# Patient Record
Sex: FEMALE | Race: WHITE | Employment: UNEMPLOYED | URBAN - METROPOLITAN AREA
[De-identification: names, ages, dates, MRNs, and addresses within clinical notes are randomized per-mention and may not be internally consistent; named-entity substitution may affect disease eponyms.]

---

## 2022-01-07 ENCOUNTER — HOSPITAL ENCOUNTER (OUTPATIENT)
Dept: PREADMISSION TESTING | Age: 87
Discharge: HOME | End: 2022-01-11

## 2022-01-07 VITALS — BODY MASS INDEX: 29.81 KG/M2 | HEIGHT: 58 IN | WEIGHT: 142 LBS

## 2022-01-07 RX ORDER — METHIMAZOLE 10 MG/1
10 TABLET ORAL DAILY
COMMUNITY

## 2022-01-07 RX ORDER — LISINOPRIL 40 MG/1
40 TABLET ORAL DAILY
COMMUNITY

## 2022-01-07 RX ORDER — HYDRALAZINE HYDROCHLORIDE 50 MG/1
50 TABLET, FILM COATED ORAL 3 TIMES DAILY
COMMUNITY

## 2022-01-07 RX ORDER — ATENOLOL 50 MG/1
50 TABLET ORAL DAILY
COMMUNITY

## 2022-01-07 RX ORDER — SIMVASTATIN 20 MG
20 TABLET ORAL NIGHTLY
COMMUNITY

## 2022-01-07 RX ORDER — CHOLECALCIFEROL (VITAMIN D3) 50 MCG
2000 TABLET ORAL DAILY
COMMUNITY

## 2022-01-07 NOTE — PROGRESS NOTES
Pre-op Instructions For Out-Patient Surgery    Medication Instructions:  · Please stop herbs and any supplements now (includes vitamins and minerals). · Please contact your surgeon and prescribing physician for pre-op instructions for any blood thinners. None    · If you have inhalers/aerosol treatments at home, please use them the morning of your surgery and bring the inhalers with you to the hospital.    · Please take the following medications the morning of your surgery with a sip of water:    Hydralazine, Lisinopril, Atenolol and Methimazole. Surgery Instructions:  1. After midnight before surgery:  Do not eat or drink anything, including water, mints, gum, and hard candy. You may brush your teeth without swallowing. No smoking, chewing tobacco, or street drugs. 2. Please shower or bathe before surgery. If you were given Surgical Scrub Chlorhexidine Gluconate Liquid (CHG), please shower the night before and the morning of your surgery following the detailed instructions you received during your pre-admission visit. 3. Please do not wear any cologne, lotion, powder, deodorant, jewelry, piercings, perfume, makeup, nail polish, hair accessories, or hair spray on the day of surgery. Wear loose comfortable clothing. 4. Leave your valuables at home. Bring a storage case for any glasses/contacts. 5. An adult who is responsible for you MUST drive you home and should be with you for the first 24 hours after surgery. 6. If having out-patient knee and foot surgeries, please arrange for planned crutches, walker, or wheelchair before arriving to the hospital.    The Day of Surgery:  · Arrive at [address] [place] [address] Surgery Entrance at the time directed by your surgeon and check in at the desk. · If you have a living will or healthcare power of , please bring a copy.     · You will be taken to the pre-op holding area where you will be prepared for surgery. A physical assessment will be performed by a nurse practitioner or house officer. Your IV will be started and you will meet your anesthesiologist.    · When you go to surgery, your family will be directed to the surgical waiting room, where the doctor should speak with them after your surgery. · After surgery, you will be taken to the recovery room then when you are awake and stable you will go to the short stay unit for preparation to be discharged. Only your one designated person is allowed to come to short stay for your discharge. · If you use a Bi-PAP or C-PAP machine, please bring it with you and leave it in the car in case it is needed in recovery room. Instructions read to [facility] and understanding verbalized.

## 2022-01-12 ENCOUNTER — HOSPITAL ENCOUNTER (OUTPATIENT)
Dept: WOMENS IMAGING | Age: 87
Discharge: HOME | End: 2022-01-14
Payer: MEDICARE

## 2022-01-12 ENCOUNTER — APPOINTMENT (OUTPATIENT)
Dept: CT IMAGING | Age: 87
End: 2022-01-12
Payer: MEDICARE

## 2022-01-12 ENCOUNTER — HOSPITAL ENCOUNTER (OUTPATIENT)
Dept: CT IMAGING | Age: 87
Discharge: HOME | End: 2022-01-14
Payer: MEDICARE

## 2022-01-12 DIAGNOSIS — N63.12 BREAST LUMP ON RIGHT SIDE AT 1 O'CLOCK POSITION: ICD-10-CM

## 2022-01-12 LAB
GFR NON-AFRICAN AMERICAN: 53 ML/MIN
GFR SERPL CREATININE-BSD FRML MDRD: >60 ML/MIN
GFR SERPL CREATININE-BSD FRML MDRD: ABNORMAL ML/MIN/{1.73_M2}
POC CREATININE: 0.99 MG/DL (ref 0.51–1.19)

## 2022-01-12 PROCEDURE — G0279 TOMOSYNTHESIS, MAMMO: HCPCS

## 2022-01-12 PROCEDURE — 82565 ASSAY OF CREATININE: CPT

## 2022-01-12 PROCEDURE — 6360000004 HC RX CONTRAST MEDICATION: Performed by: SURGERY

## 2022-01-12 PROCEDURE — 71260 CT THORAX DX C+: CPT

## 2022-01-12 PROCEDURE — 76642 ULTRASOUND BREAST LIMITED: CPT

## 2022-01-12 PROCEDURE — 2580000003 HC RX 258: Performed by: SURGERY

## 2022-01-12 RX ORDER — SODIUM CHLORIDE 0.9 % (FLUSH) 0.9 %
10 SYRINGE (ML) INJECTION PRN
Status: DISCONTINUED | OUTPATIENT
Start: 2022-01-12 | End: 2022-01-15 | Stop reason: HOSPADM

## 2022-01-12 RX ORDER — 0.9 % SODIUM CHLORIDE 0.9 %
80 INTRAVENOUS SOLUTION INTRAVENOUS ONCE
Status: COMPLETED | OUTPATIENT
Start: 2022-01-12 | End: 2022-01-12

## 2022-01-12 RX ADMIN — IOPAMIDOL 75 ML: 755 INJECTION, SOLUTION INTRAVENOUS at 11:51

## 2022-01-12 RX ADMIN — SODIUM CHLORIDE, PRESERVATIVE FREE 10 ML: 5 INJECTION INTRAVENOUS at 11:51

## 2022-01-12 RX ADMIN — SODIUM CHLORIDE 80 ML: 9 INJECTION, SOLUTION INTRAVENOUS at 11:51

## 2022-01-14 ENCOUNTER — HOSPITAL ENCOUNTER (OUTPATIENT)
Age: 87
Setting detail: OUTPATIENT SURGERY
Discharge: HOME | End: 2022-01-14
Attending: SURGERY | Admitting: SURGERY
Payer: MEDICARE

## 2022-01-14 ENCOUNTER — HOSPITAL ENCOUNTER (OUTPATIENT)
Dept: ULTRASOUND IMAGING | Age: 87
Setting detail: OUTPATIENT SURGERY
Discharge: HOME | End: 2022-01-16
Attending: SURGERY
Payer: MEDICARE

## 2022-01-14 VITALS
DIASTOLIC BLOOD PRESSURE: 77 MMHG | TEMPERATURE: 98.4 F | SYSTOLIC BLOOD PRESSURE: 156 MMHG | WEIGHT: 142 LBS | RESPIRATION RATE: 18 BRPM | HEART RATE: 75 BPM | OXYGEN SATURATION: 96 % | HEIGHT: 58 IN | BODY MASS INDEX: 29.81 KG/M2

## 2022-01-14 DIAGNOSIS — N63.0 BREAST MASS: ICD-10-CM

## 2022-01-14 PROCEDURE — 88377 M/PHMTRC ALYS ISHQUANT/SEMIQ: CPT

## 2022-01-14 PROCEDURE — 2500000003 HC RX 250 WO HCPCS: Performed by: SURGERY

## 2022-01-14 PROCEDURE — A4648 IMPLANTABLE TISSUE MARKER: HCPCS | Performed by: SURGERY

## 2022-01-14 PROCEDURE — 88360 TUMOR IMMUNOHISTOCHEM/MANUAL: CPT

## 2022-01-14 PROCEDURE — 2709999900 HC NON-CHARGEABLE SUPPLY: Performed by: SURGERY

## 2022-01-14 PROCEDURE — 76942 ECHO GUIDE FOR BIOPSY: CPT

## 2022-01-14 PROCEDURE — 7100000010 HC PHASE II RECOVERY - FIRST 15 MIN: Performed by: SURGERY

## 2022-01-14 PROCEDURE — 3600000012 HC SURGERY LEVEL 2 ADDTL 15MIN: Performed by: SURGERY

## 2022-01-14 PROCEDURE — 7100000011 HC PHASE II RECOVERY - ADDTL 15 MIN: Performed by: SURGERY

## 2022-01-14 PROCEDURE — 88305 TISSUE EXAM BY PATHOLOGIST: CPT

## 2022-01-14 PROCEDURE — 3600000002 HC SURGERY LEVEL 2 BASE: Performed by: SURGERY

## 2022-01-14 RX ORDER — LIDOCAINE HYDROCHLORIDE 10 MG/ML
INJECTION, SOLUTION INFILTRATION; PERINEURAL PRN
Status: DISCONTINUED | OUTPATIENT
Start: 2022-01-14 | End: 2022-01-14 | Stop reason: ALTCHOICE

## 2022-01-14 ASSESSMENT — PAIN - FUNCTIONAL ASSESSMENT: PAIN_FUNCTIONAL_ASSESSMENT: 0-10

## 2022-01-14 ASSESSMENT — PAIN DESCRIPTION - PAIN TYPE: TYPE: SURGICAL PAIN

## 2022-01-14 ASSESSMENT — ENCOUNTER SYMPTOMS
GASTROINTESTINAL NEGATIVE: 1
RESPIRATORY NEGATIVE: 1

## 2022-01-14 ASSESSMENT — PAIN SCALES - GENERAL: PAINLEVEL_OUTOF10: 1

## 2022-01-14 ASSESSMENT — PAIN DESCRIPTION - LOCATION: LOCATION: CHEST

## 2022-01-14 NOTE — OP NOTE
Operative Note      Patient: Troy Chua  YOB: 1935  MRN: [id number]    Date of Procedure: 1/14/2022                Preoperative diagnosis: Malignant appearing right chest wall mass and right breast mass 1 o'clock position    Postoperative diagnosis: Same    Procedure: Ultrasound-guided multiple core biopsies (14 G) right chest wall mass and right breast mass at 1 o'clock position    Surgeon: Dr. Esmer Johnson    Asst.: None    Anesthesia: Local    Preparation: Chloraprep    EBL: Less than 10 mL    Specimen: Multiple core biopsies malignant appearing right chest wall mass and right breast mass 1 o'clock position    Procedure: Informed consent was obtained. Site was marked and confirmed. Imaging studies were reviewed. Patient was taken to the operating room. Vital signs were monitored remained stable throughout the procedure. Abnormality seen on imaging studies was scanned and confirmed. Operative site was prepped and draped in usual sterile fashion. Timeout was done. Initially multiple core biopsies were obtained from this malignant appearing right chest wall mass. Local anesthetic was infiltrated. Incision was made using a #11 blade. Under ultrasound guidance multiple core biopsies were obtained from this mass using 14-gauge core biopsy needle. Specimen was submitted to pathology for further evaluation. Hemostasis was confirmed with pressure. The other mass at 1 o'clock position on the right breast was also scanned and confirmed. This area was prepped and draped in usual sterile fashion. Local anesthetic was infiltrated. Incision was made with #11 blade. Again under ultrasound guidance multiple core biopsies were obtained. Specimen was submitted to pathology for further evaluation. Hemostasis was confirmed with pressure. Clean dressing was applied.   Patient tolerated procedure well and was transferred to the recovery room in a stable condition.    -  Recommendations: Findings were discussed with the family. Postoperative course recovery restrictions local care all discussed. Await pathology results.

## 2022-01-14 NOTE — H&P
HISTORY and [address]       NAME:  Rajwinder Owens  MRN: [id number]   YOB: 1935   Date: 1/14/2022   Age: 82 y.o. Gender: female       COMPLAINT AND PRESENT HISTORY:     Rajwinder Owens is 82 y.o.,  female, here for CORE BIOPSY UPPER INTER QUADRANT BREAST EXCISION W/ULTRASOUND & TECH Right per dr Ashley Saini     Pre diagnosis:RIGHT BREAST MASS   HPI:  Pt states she notice hard lump on her upper chest started  6-7 months ago, and it has worsened in the last months. Pt denies any injury to the right breast,  no FH of breast cancer . Pt reports that if  she hit the lump hard , it bleeds,  If she sleep on her chest she feel pain otherwise no pain. Pt states her right nipple is dimpling, no drainage, no redness. Pt denies fever/chills, chest pain or SOB, no night sweats, no itching skin, no fatigue,or unexplained weight loss. Test completed r/t condition:  US BREAST LIMITED RIGHT  FINDINGS:   Breast parenchyma is scattered fibroglandular.       There are benign calcifications in each breast.  These are larger and   somewhat amorphous in the left breast.  There are benign vascular   calcifications.       Unremarkable mammographic appearance of the left breast.       Because of an ulcerating mass over the upper right breast near the chest   wall, only a small amount of right breast tissue could be imaged. Julio Cesar Muñoz is   suggestion of retraction of the right nipple.  Also suggestion of a mass far   posterior in the right breast, incompletely imaged, and containing some   calcifications.       RIGHT BREAST ULTRASOUND: The large ulcerating mass at 1 o'clock and 14 cm   from the nipple is somewhat heterogeneous and extremely vascular in nature.    It extends far posterior to the pectoralis and possibly through the   pectoralis muscle by ultrasound evaluation.  This is very suspicious for   neoplastic process.       A 2nd mass is located at 1 o'clock and 5 cm from the nipple and correlates   with CT findings today.  This measures at least 11 x 12 x 10 mm and is very   irregular in outline and taller than wide.  It is very suspicious for   neoplastic process.           Impression   Two very suspicious masses in the right breast, as described above. Leeanne Rojas   is recommended.       Dr. Lisa Tineo and the in-house nurse navigator personally spoke with this   patient and her daughter following the patient's studies today.  The daughter   expressed understanding.       The patient and her daughter were encouraged to make contact with her surgeon   for further information regarding follow-up.       BIRADS:   BIRADS - CATEGORY 5       Highly Suggestive for Malignancy.  Biopsy should be considered at this time.       OVERALL ASSESSMENT - HIGHLY SUGGESTIVE OF MALIGNANCY.       A letter of notification will be sent to the patient regarding the results.       My findings and recommendations were discussed with the patient at the time   of service.       A representative from the radiology department will be contacting your office   and assisting the patient in getting appropriate follow-up.           CT CHEST W CONTRAST  FINDINGS:   Mediastinum: Atherosclerotic disease identified within the thoracic aorta.    No focal aortic stenosis is identified.  No aortic dissection.  Great vessel   atherosclerotic disease without stenosis.  No central pulmonary emboli are   identified.  No pericardial effusion is identified.  Mild cardiomegaly.  No   significant focal wall thickening is identified.  No mediastinal   lymphadenopathy is seen based on size criteria.  Calcified lymph nodes are   seen felt related to remote granulomatous process.       Lungs/pleura: Scattered calcified pulmonary nodules felt related to chronic   granulomatous process.  Dependent changes are seen with subsegmental   appearing atelectasis.  No airspace consolidation is identified.  No   spiculated lung mass is seen.  Post inflammatory appearing right apical   scarring.       Upper Abdomen: No right-sided adrenal mass is identified. Randarisimone Rothman is a 1.8 x   2.1 cm left adrenal mass which measures 55 Hounsfield units on this single   phase postcontrast CT study.  No enhancing mass identified in the liver. Benign-appearing liver calcifications seen at the dome of the liver.  No   splenic mass is identified.       Soft Tissues/Bones: No axillary lymphadenopathy is identified.  Multinodular   appearing thyroid gland with small partially calcified nodules, the largest   of which measuring 13 mm in the left thyroid lobe.  Within the parasternal   soft tissues of the right anterior chest wall, on and around axial image 46,   is a soft tissue lobulated appearing irregular mass measuring 3.1 x 2.3 cm,   with a concerning area of focal soft tissue extension from the posterior   aspect of the mass to the anterior pleural surface, on axial image 46   adjacent to the internal thoracic artery. Holly Rothman is a 2nd enhancing breast   nodule seen measuring 10 x 11 mm on axial image 56.           Impression   There is a large ulcerating mass over the right breast medially corresponding   to recent ultrasound and mammographic evaluation, complex in shape, with   extension through the pectoralis musculature posteriorly, with a small focal   linear area of suspected soft tissue extension through the anterior chest   wall adjacent to the anterior pleural surface adjacent and internal thoracic   artery best seen on axial image 46.       There is a second small enhancing breast mass measuring 11 mm in greatest   dimension on axial image 56 corresponding to the recently seen mammographic   exams.       No enlarged lymphadenopathy is seen in the right axillary region.       Nonspecific left adrenal mass measuring 18 x 21 mm, and 55 Hounsfield units.    This does not meet criteria for a benign adenoma, with metastatic disease   unable to be excluded.       Small partially calcified thyroid nodules, the largest of which measuring 13   mm.       RECOMMENDATIONS:   Managing Incidental Thyroid Nodule Detected at CT or MRI or US       1.  Further evaluation by thyroid Ultrasound recommended for these incidental   nodules:       Patient Age 28 years or more - Nodule 1.5 cm in size or greater       2. Follow up thyroid ultrasound also recommend in these scenarios       - Solitary nodule with high risk imaging features (locally invasive nodule or   suspicious lymph nodes)       - Heterogeneous, enlarged thyroid gland.       - Increased uptake on PET       3.  No further imaging is recommended in the following scenarios       - Any nodule not meeting above criteria.       - Those patients with limited life expectancy or significant       Co-morbidities.       Note: These recommendations do not apply to pts. w/ increased risk       for thyroid cancer or pts. with symptomatic thyroid disease.       ________________________________________________________________       Recommendations for f/u of Incidental Thyroid Nodules (ITN)       found on CT, MR, NM and Extrathyroidal US are based upon the ACR       white paper and [facility] 3-tiered system for managing ITNs:       J Am Sumit Radiol.  2015 Feb;12(2): 143-50     JASWANT GENEVIEVE DIGITAL DIAGNOSTIC BILATERAL  FINDINGS:   Breast parenchyma is scattered fibroglandular.       There are benign calcifications in each breast.  These are larger and   somewhat amorphous in the left breast.  There are benign vascular   calcifications.       Unremarkable mammographic appearance of the left breast.       Because of an ulcerating mass over the upper right breast near the chest   wall, only a small amount of right breast tissue could be imaged. Nino Spatz is   suggestion of retraction of the right nipple.  Also suggestion of a mass far   posterior in the right breast, incompletely imaged, and containing some   calcifications.       RIGHT BREAST ULTRASOUND: The large ulcerating mass at 1 o'clock and 14 cm   from the nipple is somewhat heterogeneous and extremely vascular in nature. It extends far posterior to the pectoralis and possibly through the   pectoralis muscle by ultrasound evaluation.  This is very suspicious for   neoplastic process.       A 2nd mass is located at 1 o'clock and 5 cm from the nipple and correlates   with CT findings today.  This measures at least 11 x 12 x 10 mm and is very   irregular in outline and taller than wide.  It is very suspicious for   neoplastic process.           Impression   Two very suspicious masses in the right breast, as described above.  Biopsy   is recommended.       Dr. Marifer Muñiz and the in-house nurse navigator personally spoke with this   patient and her daughter following the patient's studies today.  The daughter   expressed understanding.       The patient and her daughter were encouraged to make contact with her surgeon   for further information regarding follow-up.       BIRADS:   BIRADS - CATEGORY 5       Highly Suggestive for Malignancy.  Biopsy should be considered at this time.       OVERALL ASSESSMENT - HIGHLY SUGGESTIVE OF MALIGNANCY.       A letter of notification will be sent to the patient regarding the results.       My findings and recommendations were discussed with the patient at the time   of service.       A representative from the radiology department will be contacting your office   and assisting the patient in getting appropriate follow-up.               Review of additional significant medical hx:  HTN, hyperlipidemia  MEDICATION R/T CONDITION : ATENOLOL , APRESOLINE, LISINOPRIL, ZOCOR    THYROID DISEASE  MEDICATION R/T CONDITION : TAPAZOLE       NPO status: Pt NPO since the past midnight   Medications taken TODAY (with sip of water): pt took her BP medication with sip of water  Anticoagulation status: none   Denies personal hx of blood clots. Denies personal hx of MRSA infection.   Denies any personal or family hx of previous complications w/anesthesia. PAST MEDICAL HISTORY     Past Medical History:   Diagnosis Date    Chest wall mass     Hyperlipidemia     Hypertension     Pseudogout     Acute Pain in Left knee    Thyroid disease        SURGICAL HISTORY       Past Surgical History:   Procedure Laterality Date    EYE SURGERY      cataract bilat     SKIN BIOPSY      Right eye - positive        FAMILY HISTORY     No family history on file. SOCIAL HISTORY       Social History     Socioeconomic History    Marital status:      Spouse name: Not on file    Number of children: Not on file    Years of education: Not on file    Highest education level: Not on file   Occupational History    Not on file   Tobacco Use    Smoking status: Never Smoker    Smokeless tobacco: Never Used   Vaping Use    Vaping Use: Never used   Substance and Sexual Activity    Alcohol use: Yes     Comment: Occasional     Drug use: Never    Sexual activity: Not on file   Other Topics Concern    Not on file   Social History Narrative    Not on file     Social Determinants of Health     Financial Resource Strain:     Difficulty of Paying Living Expenses: Not on file   Food Insecurity:     Worried About Running Out of Food in the Last Year: Not on file    Colleen of Food in the Last Year: Not on file   Transportation Needs:     Lack of Transportation (Medical): Not on file    Lack of Transportation (Non-Medical):  Not on file   Physical Activity:     Days of Exercise per Week: Not on file    Minutes of Exercise per Session: Not on file   Stress:     Feeling of Stress : Not on file   Social Connections:     Frequency of Communication with Friends and Family: Not on file    Frequency of Social Gatherings with Friends and Family: Not on file    Attends Jewish Services: Not on file    Active Member of Clubs or Organizations: Not on file    Attends Club or Organization Meetings: Not on file    Marital Status: Not on file Intimate Partner Violence:     Fear of Current or Ex-Partner: Not on file    Emotionally Abused: Not on file    Physically Abused: Not on file    Sexually Abused: Not on file   Housing Stability:     Unable to Pay for Housing in the Last Year: Not on file    Number of [place] in the Last Year: Not on file    Unstable Housing in the Last Year: Not on file           REVIEW OF SYSTEMS      No Known Allergies    No current facility-administered medications on file prior to encounter. No current outpatient medications on file prior to encounter. Review of Systems   Constitutional: Negative. HENT: Negative. Respiratory: Negative. Cardiovascular: Negative. Gastrointestinal: Negative. Genitourinary: Negative. Musculoskeletal: Negative. Skin: Negative. Neurological: Negative. Hematological: Negative. Psychiatric/Behavioral: Negative. GENERAL PHYSICAL EXAM     Vitals: see nursing flow sheet for vital signs     GENERAL APPEARANCE:   Naty Retana is 82 y.o.,  female, , nourished, conscious, alert. Does not appear to be distress or pain at this time. Physical Exam  Constitutional:       Appearance: Normal appearance. HENT:      Head: Normocephalic. Right Ear: External ear normal.      Left Ear: External ear normal.      Nose: Nose normal.      Mouth/Throat:      Mouth: Mucous membranes are moist.   Eyes:      General:         Right eye: No discharge. Left eye: No discharge. Pupils: Pupils are equal, round, and reactive to light. Cardiovascular:      Rate and Rhythm: Normal rate and regular rhythm. Pulses: Normal pulses. Radial pulses are 2+ on the right side and 2+ on the left side. Dorsalis pedis pulses are 2+ on the right side and 2+ on the left side. Posterior tibial pulses are 2+ on the right side and 2+ on the left side. Heart sounds: Normal heart sounds.  No murmur heard.  No gallop. Pulmonary:      Effort: Pulmonary effort is normal. No respiratory distress. Breath sounds: Normal breath sounds. No wheezing or rhonchi. Chest:   Breasts:      Right: Inverted nipple present. No nipple discharge or tenderness. Comments: There is hard lump on the right upper chest with retraction in the middle, redness, no drainage , no tenderness with palpation. Right nipple retraction, no discharge, no swelling. Abdominal:      General: Bowel sounds are normal. There is no distension. Palpations: Abdomen is soft. Tenderness: There is no abdominal tenderness. There is no guarding. Musculoskeletal:         General: Normal range of motion. Cervical back: Normal range of motion and neck supple. Right lower leg: No edema. Left lower leg: No edema. Skin:     General: Skin is warm. Findings: No bruising or erythema. Neurological:      General: No focal deficit present. Mental Status: She is alert and oriented to person, place, and time. Gait: Gait normal.   Psychiatric:         Mood and Affect: Mood normal.         Behavior: Behavior normal.                   PROVISIONAL DIAGNOSES / SURGERY:    RIGHT BREAST MASS     CORE BIOPSY UPPER INTER QUADRANT BREAST EXCISION W/ULTRASOUND & TECH Right      There are no problems to display for this patient.           LITZY Pro CNP on 1/14/2022 at 8:52 AM

## 2022-01-14 NOTE — BRIEF OP NOTE
Brief Postoperative Note      Patient: Sandra Castro  YOB: 1935  MRN: [id number]    Date of Procedure: 1/14/2022    Pre-Op Diagnosis: RIGHT BREAST MASS  (PT HAS HAD COVID VACCINE)    Post-Op Diagnosis: Same       Procedure(s):  CORE BIOPSY UPPER INTER QUADRANT BREAST EXCISION W/ULTRASOUND & TECH    Surgeon(s):  Kyler Weems MD    Assistant:  * No surgical staff found *    Anesthesia: Local    Estimated Blood Loss (mL): less than 50     Complications: None    Specimens:   ID Type Source Tests Collected by Time Destination   A : RIGHT CHEST WALL MASS Tissue Chest SURGICAL PATHOLOGY Kyler Weems MD 1/14/2022 1114    B : RIGHT BREAST MASS AT 1:00 5 CM FROM NIPPLE BIOPSY Tissue Breast SURGICAL PATHOLOGY Kyler Weems MD 1/14/2022 1125        Implants:  * No implants in log *      Drains: * No LDAs found *    Findings: dictated    Electronically signed by Kyler Weems MD on 1/14/2022 at 11:43 AM

## 2022-01-18 LAB — SURGICAL PATHOLOGY REPORT: NORMAL

## 2022-12-19 ENCOUNTER — HOSPITAL ENCOUNTER (OUTPATIENT)
Dept: MRI IMAGING | Age: 87
Discharge: HOME | End: 2022-12-21
Payer: MEDICARE

## 2022-12-19 DIAGNOSIS — C50.211 CARCINOMA OF UPPER-INNER QUADRANT OF RIGHT FEMALE BREAST, UNSPECIFIED ESTROGEN RECEPTOR STATUS (HCC): ICD-10-CM

## 2022-12-19 LAB
EGFR, POC: >60 ML/MIN/1.73M2
POC CREATININE: 0.91 MG/DL (ref 0.51–1.19)

## 2022-12-19 PROCEDURE — A9579 GAD-BASE MR CONTRAST NOS,1ML: HCPCS | Performed by: SURGERY

## 2022-12-19 PROCEDURE — 6360000004 HC RX CONTRAST MEDICATION: Performed by: SURGERY

## 2022-12-19 PROCEDURE — 82565 ASSAY OF CREATININE: CPT

## 2022-12-19 PROCEDURE — C8908 MRI W/O FOL W/CONT, BREAST,: HCPCS

## 2022-12-19 PROCEDURE — 2580000003 HC RX 258: Performed by: SURGERY

## 2022-12-19 RX ORDER — 0.9 % SODIUM CHLORIDE 0.9 %
40 INTRAVENOUS SOLUTION INTRAVENOUS ONCE
Status: COMPLETED | OUTPATIENT
Start: 2022-12-19 | End: 2022-12-19

## 2022-12-19 RX ORDER — SODIUM CHLORIDE 0.9 % (FLUSH) 0.9 %
10 SYRINGE (ML) INJECTION PRN
Status: DISCONTINUED | OUTPATIENT
Start: 2022-12-19 | End: 2022-12-22 | Stop reason: HOSPADM

## 2022-12-19 RX ADMIN — GADOTERIDOL 15 ML: 279.3 INJECTION, SOLUTION INTRAVENOUS at 12:17

## 2022-12-19 RX ADMIN — SODIUM CHLORIDE 40 ML: 9 INJECTION, SOLUTION INTRAVENOUS at 12:17

## 2022-12-19 RX ADMIN — SODIUM CHLORIDE, PRESERVATIVE FREE 10 ML: 5 INJECTION INTRAVENOUS at 12:17

## 2022-12-30 ENCOUNTER — HOSPITAL ENCOUNTER (OUTPATIENT)
Dept: WOMENS IMAGING | Age: 87
Discharge: HOME | End: 2022-12-30
Payer: MEDICARE

## 2022-12-30 ENCOUNTER — HOSPITAL ENCOUNTER (OUTPATIENT)
Dept: WOMENS IMAGING | Age: 87
End: 2022-12-30
Payer: MEDICARE

## 2022-12-30 DIAGNOSIS — N63.15 BREAST LUMP ON RIGHT SIDE AT 3 O'CLOCK POSITION: ICD-10-CM

## 2022-12-30 DIAGNOSIS — R93.89 ABNORMAL FINDING ON RADIOLOGY EXAM: ICD-10-CM

## 2022-12-30 PROCEDURE — 77066 DX MAMMO INCL CAD BI: CPT

## 2022-12-30 PROCEDURE — 76642 ULTRASOUND BREAST LIMITED: CPT

## 2023-09-08 ENCOUNTER — HOSPITAL ENCOUNTER (OUTPATIENT)
Dept: PREADMISSION TESTING | Age: 88
Discharge: HOME | End: 2023-09-12

## 2023-09-08 VITALS — HEIGHT: 58 IN | BODY MASS INDEX: 29.81 KG/M2 | WEIGHT: 142 LBS

## 2023-09-08 NOTE — PROGRESS NOTES
Pre-op Instructions For Out-Patient Surgery   LOCAL    Medication Instructions:  Please stop herbs and any supplements now (includes vitamins and minerals). Please contact your surgeon and prescribing physician for pre-op instructions for any blood thinners. If you have inhalers/aerosol treatments at home, please use them the morning of your surgery and bring the inhalers with you to the hospital.    Please take the following medications the morning of your surgery with a sip of water:    N/A    Surgery Instructions:  After midnight before surgery:  Do not eat or drink anything, including water, mints, gum, and hard candy. You may brush your teeth without swallowing. No smoking, chewing tobacco, or street drugs. N/A    Please shower or bathe before surgery. If you were given Surgical Scrub Chlorhexidine Gluconate Liquid (CHG), please shower the night before and the morning of your surgery following the detailed instructions you received during your pre-admission visit. Please do not wear any cologne, lotion, powder, deodorant, jewelry, piercings, perfume, makeup, nail polish, hair accessories, or hair spray on the day of surgery. Wear loose comfortable clothing. Leave your valuables at home but bring a payment source for any after-surgery prescriptions you plan to fill at [facility]. Bring a storage case for any glasses/contacts. An adult who is responsible for you MUST drive you home and should be with you for the first 24 hours after surgery. If having out-patient knee and foot surgeries, please arrange for planned crutches, walker, or wheelchair before arriving to the hospital. N/A    The Day of Surgery:  Arrive at [facility] Surgery Entrance at the time directed by your surgeon and check in at the desk. If you have a living will or healthcare power of , please bring a copy.     You will be taken to the pre-op holding area

## 2023-09-28 NOTE — PRE-PROCEDURE INSTRUCTIONS
Nothing to eat after midnight. NA  Are you taking any blood thinners? When was the last day? Make sure to use Hibiclens prior to surgery. Remove any jewelry and body piercings. Do you wear glasses? If so, please bring a case to store them in. Are you having any Covid symptoms? Do you have any new rashes, infections, etc. that we should be aware of? Do you have a ride home the day of surgery? It cannot be a cab or medical transportation.   Verify surgery time and what time to arrive at hospital.    UNABLE TO LEAVE VM

## 2023-09-29 ENCOUNTER — HOSPITAL ENCOUNTER (OUTPATIENT)
Dept: ULTRASOUND IMAGING | Age: 88
Setting detail: OUTPATIENT SURGERY
End: 2023-09-29
Attending: SURGERY
Payer: MEDICARE

## 2023-09-29 ENCOUNTER — HOSPITAL ENCOUNTER (OUTPATIENT)
Age: 88
Setting detail: OUTPATIENT SURGERY
Discharge: HOME | End: 2023-09-29
Attending: SURGERY | Admitting: SURGERY
Payer: MEDICARE

## 2023-09-29 VITALS
BODY MASS INDEX: 29.81 KG/M2 | HEIGHT: 58 IN | RESPIRATION RATE: 18 BRPM | DIASTOLIC BLOOD PRESSURE: 102 MMHG | TEMPERATURE: 97.1 F | HEART RATE: 60 BPM | WEIGHT: 142 LBS | SYSTOLIC BLOOD PRESSURE: 150 MMHG | OXYGEN SATURATION: 97 %

## 2023-09-29 DIAGNOSIS — Z85.3 PERSONAL HISTORY OF MALIGNANT NEOPLASM OF BREAST: ICD-10-CM

## 2023-09-29 PROCEDURE — A4648 IMPLANTABLE TISSUE MARKER: HCPCS | Performed by: SURGERY

## 2023-09-29 PROCEDURE — 88342 IMHCHEM/IMCYTCHM 1ST ANTB: CPT

## 2023-09-29 PROCEDURE — 2709999900 HC NON-CHARGEABLE SUPPLY: Performed by: SURGERY

## 2023-09-29 PROCEDURE — 76942 ECHO GUIDE FOR BIOPSY: CPT

## 2023-09-29 PROCEDURE — 3600000012 HC SURGERY LEVEL 2 ADDTL 15MIN: Performed by: SURGERY

## 2023-09-29 PROCEDURE — 7100000010 HC PHASE II RECOVERY - FIRST 15 MIN: Performed by: SURGERY

## 2023-09-29 PROCEDURE — 88360 TUMOR IMMUNOHISTOCHEM/MANUAL: CPT

## 2023-09-29 PROCEDURE — 7100000011 HC PHASE II RECOVERY - ADDTL 15 MIN: Performed by: SURGERY

## 2023-09-29 PROCEDURE — 88341 IMHCHEM/IMCYTCHM EA ADD ANTB: CPT

## 2023-09-29 PROCEDURE — 3600000002 HC SURGERY LEVEL 2 BASE: Performed by: SURGERY

## 2023-09-29 PROCEDURE — 2500000003 HC RX 250 WO HCPCS: Performed by: SURGERY

## 2023-09-29 PROCEDURE — 6370000000 HC RX 637 (ALT 250 FOR IP): Performed by: SURGERY

## 2023-09-29 PROCEDURE — 88305 TISSUE EXAM BY PATHOLOGIST: CPT

## 2023-09-29 RX ORDER — ATENOLOL 50 MG/1
50 TABLET ORAL ONCE
Status: COMPLETED | OUTPATIENT
Start: 2023-09-29 | End: 2023-09-29

## 2023-09-29 RX ORDER — LIDOCAINE HYDROCHLORIDE 10 MG/ML
INJECTION, SOLUTION INFILTRATION; PERINEURAL PRN
Status: DISCONTINUED | OUTPATIENT
Start: 2023-09-29 | End: 2023-09-29 | Stop reason: ALTCHOICE

## 2023-09-29 RX ORDER — LISINOPRIL 20 MG/1
40 TABLET ORAL ONCE
Status: COMPLETED | OUTPATIENT
Start: 2023-09-29 | End: 2023-09-29

## 2023-09-29 RX ADMIN — ATENOLOL 50 MG: 50 TABLET ORAL at 13:44

## 2023-09-29 RX ADMIN — LISINOPRIL 40 MG: 20 TABLET ORAL at 13:44

## 2023-09-29 ASSESSMENT — PAIN - FUNCTIONAL ASSESSMENT: PAIN_FUNCTIONAL_ASSESSMENT: 0-10

## 2023-09-29 NOTE — DISCHARGE INSTRUCTIONS
You may shower tomorrow  Remove top dressing in 48 hours. Leave steri strips on. Keep incision clean and dry. No lotions, ointments, alcohol or peroxide. Avoid heavy lifting or strenuous activity until seen by Dr Opal Vasquez.

## 2023-09-29 NOTE — PROGRESS NOTES
Dr Opal Vasquez notified of blood pressure of 208/72 and that pt only had one of her blood pressure pills this morning. New orders received.

## 2023-09-29 NOTE — H&P
IMPRESSION:  1. 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 2.  There has been reduction in size on ultrasound of the patient's right  breast cancer at 1 o'clock 13 cm from the nipple with measurements as above. 3.  Prior right breast lesion at 1 o'clock 5 cm from the nipple is not  redemonstrated. 4.  Findings demonstrate partial response to treatment with oral therapy. BIRADS - CATEGORY 6    Known Biopsy-Proven Cancer. Appropriate action should be taken. OVERALL ASSESSMENT - KNOWN BIOPSY PROVEN MALIGNANCY     PAST MEDICAL HISTORY     Past Medical History:   Diagnosis Date    Chest wall mass     Hyperlipidemia     Hypertension     Pseudogout     Acute Pain in Left knee    Thyroid disease        SURGICAL HISTORY       Past Surgical History:   Procedure Laterality Date    BREAST BIOPSY Right 1/14/2022    CORE BIOPSY UPPER INTER QUADRANT BREAST EXCISION W/ULTRASOUND & TECH performed by Miguel Flores MD at [address] [place] [address]      Right eye - positive        FAMILY HISTORY     No family history on file. SOCIAL HISTORY       Social History     Socioeconomic History    Marital status:    Tobacco Use    Smoking status: Never    Smokeless tobacco: Never   Vaping Use    Vaping Use: Never used   Substance and Sexual Activity    Alcohol use: Yes     Comment: Occasional     Drug use: Never           REVIEW OF SYSTEMS      No Known Allergies    No current facility-administered medications on file prior to encounter.      Current Outpatient Medications on File Prior to Encounter   Medication Sig Dispense Refill    atenolol (TENORMIN) 50 MG tablet Take 1 tablet by mouth daily      hydrALAZINE (APRESOLINE) 50 MG tablet Take 1 tablet by mouth 3 times daily      lisinopril (PRINIVIL;ZESTRIL) 40 MG tablet Take 1 tablet by mouth daily      methIMAzole neoplasm of breast [Z85.3]       There are no problems to display for this patient.         Note marked for cosign by provider      LITZY Myers CNP on 9/29/2023 at 11:51 AM

## 2023-09-29 NOTE — OP NOTE
Patient: Lisa Harley  YOB: 1935  MRN: [id number]    Date of Procedure: 9/29/2023        Preoperative diagnosis: Right chest wall mass likely metastatic breast cancer    Postoperative diagnosis: Same    Procedure: Ultrasound-guided multiple core biopsies right chest wall mass x5    Surgeon: Dr. Anu Sierra    Asst. none    Anesthesia: Local    Preparation: Chloraprep    EBL: Less than 10 mL    Specimen: Multiple core biopsies right chest wall mass x5    Procedure: Informed consent was obtained. Site was marked and confirmed. Patient was taken to the operating room. Vital signs were monitored remained stable. Operative site was prepped and draped in usual sterile fashion. Timeout was done. Under ultrasound guidance multiple core biopsies were performed from this right anterior chest wall mass using a 16-gauge Ozzie-Cut biopsy needle. Total 5 biopsies were performed using the Ozzie-Cut needle. Specimen was labeled and sent to pathology. Hemostasis was confirmed with pressure. Steri-Strips were applied. Sterile dressing was applied. Patient tolerated procedure well and was transferred to the recovery room in a stable condition.  -  Recommendations: Findings discussed with the patient and the family at length. Postoperative care discussed. Office follow-up discussed.

## 2023-10-04 LAB — SURGICAL PATHOLOGY REPORT: NORMAL

## 2023-10-05 ENCOUNTER — TELEPHONE (OUTPATIENT)
Dept: ONCOLOGY | Age: 88
End: 2023-10-05

## 2023-10-05 NOTE — TELEPHONE ENCOUNTER
Navigator notified of pt via pathology report. Pt has surgery with Dr Génesis Sage and is an oncology pt with [facility]. Navigation will not be initiated.

## (undated) DEVICE — TOWEL,OR,DSP,ST,BLUE,STD,6/PK,12PK/CS: Brand: MEDLINE

## (undated) DEVICE — GAUZE,SPONGE,FLUFF,6"X6.75",STRL,5/TRAY: Brand: MEDLINE

## (undated) DEVICE — INTENDED FOR TISSUE SEPARATION, AND OTHER PROCEDURES THAT REQUIRE A SHARP SURGICAL BLADE TO PUNCTURE OR CUT.: Brand: BARD-PARKER ® CARBON RIB-BACK BLADES

## (undated) DEVICE — MAX-CORE® DISPOSABLE CORE BIOPSY INSTRUMENT, 16G X 16CM: Brand: MAX-CORE

## (undated) DEVICE — GLOVE ORTHO 7 1/2   MSG9475

## (undated) DEVICE — DRESSING TRNSPAR W5XL4.5IN FLM SHT SEMIPERMEABLE WIND

## (undated) DEVICE — PAD,NON-ADHERENT,3X8,STERILE,LF,1/PK: Brand: MEDLINE

## (undated) DEVICE — PROVE COVER: Brand: UNBRANDED

## (undated) DEVICE — SYRINGE,CONTROL,LL,FINGER,GRIP: Brand: MEDLINE INDUSTRIES, INC.

## (undated) DEVICE — MAX-CORE® DISPOSABLE CORE BIOPSY INSTRUMENT, 14G X 16CM: Brand: MAX-CORE

## (undated) DEVICE — ULTRACLIP® DUAL TRIGGER BREAST TISSUE MARKER 17G X 10CM NEEDLE, ULTRASOUND ENHANCED BIODUR™ 108 COIL: Brand: ULTRACLIP® DUAL TRIGGER BREAST TISSUE MARKER

## (undated) DEVICE — NEEDLE HYPO 25GA L1.5IN BLU POLYPR HUB S STL REG BVL STR

## (undated) DEVICE — SYRINGE MED 10ML LUERLOCK TIP W/O SFTY DISP

## (undated) DEVICE — STRIP,CLOSURE,WOUND,MEDI-STRIP,1/2X4: Brand: MEDLINE

## (undated) DEVICE — DRESSING TRNSPAR W2XL2.75IN FLM SHT SEMIPERMEABLE WIND

## (undated) DEVICE — SYRINGE MED 10ML TRNSLUC BRL PLUNG BLK MRK POLYPR CTRL

## (undated) DEVICE — APPLICATOR MEDICATED 26 CC SOLUTION HI LT ORNG CHLORAPREP

## (undated) DEVICE — GAUZE,SPONGE,4"X4",16PLY,XRAY,STRL,LF: Brand: MEDLINE

## (undated) DEVICE — BLADE,CARBON-STEEL,11,STRL,DISPOSABLE,TB: Brand: MEDLINE